# Patient Record
Sex: MALE | Race: ASIAN | NOT HISPANIC OR LATINO | URBAN - METROPOLITAN AREA
[De-identification: names, ages, dates, MRNs, and addresses within clinical notes are randomized per-mention and may not be internally consistent; named-entity substitution may affect disease eponyms.]

---

## 2017-09-10 PROBLEM — 274774002 ELEVATED LEVELS OF TRANSAMINASE & LACTIC ACID DEHYDROGENASE: Status: ACTIVE | Noted: 2017-05-04

## 2017-09-10 PROBLEM — 398050005 DIVERTICULAR DISEASE OF COLON: Status: ACTIVE | Noted: 2017-07-28

## 2017-09-10 PROBLEM — 68496003 POLYP OF COLON: Status: ACTIVE | Noted: 2017-07-31

## 2017-09-10 PROBLEM — 721703004 FIRST DEGREE HEMORRHOIDS: Status: ACTIVE | Noted: 2017-07-31

## 2017-09-10 PROBLEM — 266433003 GASTRO-ESOPHAGEAL REFLUX DISEASE WITH ESOPHAGITIS: Status: ACTIVE | Noted: 2017-05-04

## 2018-08-20 PROBLEM — 271835004: Status: ACTIVE | Noted: 2018-08-20

## 2018-08-20 PROBLEM — 442076002: Status: ACTIVE | Noted: 2018-08-20

## 2023-06-08 ENCOUNTER — TELEPHONE ENCOUNTER (OUTPATIENT)
Dept: URBAN - METROPOLITAN AREA CLINIC 35 | Facility: CLINIC | Age: 78
End: 2023-06-08

## 2023-06-12 ENCOUNTER — OFFICE VISIT (OUTPATIENT)
Dept: URBAN - METROPOLITAN AREA CLINIC 33 | Facility: CLINIC | Age: 78
End: 2023-06-12
Payer: COMMERCIAL

## 2023-06-12 VITALS
HEIGHT: 63 IN | OXYGEN SATURATION: 99 % | HEART RATE: 60 BPM | BODY MASS INDEX: 25.52 KG/M2 | SYSTOLIC BLOOD PRESSURE: 110 MMHG | DIASTOLIC BLOOD PRESSURE: 66 MMHG | WEIGHT: 144 LBS

## 2023-06-12 DIAGNOSIS — K21.9 GASTROESOPHAGEAL REFLUX DISEASE, UNSPECIFIED WHETHER ESOPHAGITIS PRESENT: ICD-10-CM

## 2023-06-12 DIAGNOSIS — K22.70 BARRETT'S ESOPHAGUS WITHOUT DYSPLASIA: ICD-10-CM

## 2023-06-12 PROCEDURE — 99214 OFFICE O/P EST MOD 30 MIN: CPT | Performed by: INTERNAL MEDICINE

## 2023-06-12 RX ORDER — TAMSULOSIN HYDROCHLORIDE 0.4 MG/1
1 CAPSULE CAPSULE ORAL ONCE A DAY
Status: ACTIVE | COMMUNITY

## 2023-06-12 RX ORDER — SUCRALFATE 1 G/1
1 TABLET TABLET ORAL TWICE A DAY
Qty: 180 TABLET | Refills: 2 | Status: ON HOLD | COMMUNITY
Start: 2017-12-09

## 2023-06-12 RX ORDER — RANITIDINE 150 MG/1
1 TABLET TABLET, COATED ORAL
Status: ON HOLD | COMMUNITY
Start: 2017-05-04

## 2023-06-12 RX ORDER — OMEGA-3 FATTY ACIDS/FISH OIL 360-1200MG
1 CAPSULE CAPSULE ORAL ONCE A DAY
Status: ACTIVE | COMMUNITY

## 2023-06-12 RX ORDER — FLUTICASONE FUROATE AND VILANTEROL TRIFENATATE 100; 25 UG/1; UG/1
1 PUFF POWDER RESPIRATORY (INHALATION) ONCE A DAY
Status: ACTIVE | COMMUNITY

## 2023-06-12 RX ORDER — CALCIUM CARBONATE 600 MG
TABLET ORAL ONCE A DAY
Status: ON HOLD | COMMUNITY

## 2023-06-12 RX ORDER — MONTELUKAST 10 MG/1
1 TABLET TABLET, FILM COATED ORAL ONCE A DAY
Status: ACTIVE | COMMUNITY

## 2023-06-12 RX ORDER — FAMOTIDINE 40 MG/1
1 TABLET AM AND AT BEDTIME TABLET, FILM COATED ORAL TWICE A DAY
Status: ACTIVE | COMMUNITY

## 2023-06-12 RX ORDER — SUCRALFATE 1 G/1
1 TABLET ON AN EMPTY STOMACH TABLET ORAL TWICE A DAY
OUTPATIENT
Start: 2023-06-23

## 2023-06-12 RX ORDER — FAMOTIDINE 40 MG/1
1 TABLET AM AND AT BEDTIME TABLET, FILM COATED ORAL TWICE A DAY
OUTPATIENT

## 2023-06-12 RX ORDER — ASPIRIN 81 MG/1
1 TABLET TABLET, COATED ORAL ONCE A DAY
Status: ACTIVE | COMMUNITY

## 2023-06-12 RX ORDER — CLOPIDOGREL BISULFATE 75 MG/1
1 TABLET TABLET ORAL ONCE A DAY
Status: ACTIVE | COMMUNITY

## 2023-06-12 RX ORDER — RANITIDINE HYDROCHLORIDE 300 MG/1
1 TABLET TABLET, FILM COATED ORAL BID
Status: DISCONTINUED | COMMUNITY

## 2023-06-12 RX ORDER — MONTELUKAST SODIUM 10 MG/1
1 TABLET IN THE EVENING TABLET, FILM COATED ORAL ONCE A DAY
Status: ACTIVE | COMMUNITY

## 2023-06-12 NOTE — HPI-BARRETT'S ESOPHAGUS
78 year old male patient presents today for a lonstanding history of Valencia's Esophagus. Patient admits longstanding hx of heartburn and acid reflux. Patient states that his current symptoms are SOB and a non productive cough associated with eating on May 15. He did drink alcohol on that day which exacerbated.  He denies taking any prescribe or OTC medications without relief of his symptoms.   Patient denies any associated symptoms of dyspepsia, dysphagia, excessive belching, globus, sour eructations, bloating/gas, early satiety, changes in appetite, coughing, abdominal/epigastric pain, or changes in bowel habits. . Patient admits/denies any family hx of gastric/esophageal cancer or diseases.    Patient last EGD was completed on 12.08.2017.

## 2023-06-12 NOTE — HPI-GERD
Patient admits long standing history of GERD. Patient states his last episode of gerd was this morning and occured last night. He describes his symtoms as a burning sensation. Patient admits continued of Zantac to control  his symptoms.   Denies any recent episodes of  dysphagia, globus, sour eructation, bloating/gas, indigestion, early satiety, changes in appetite, abdominal/epigastric pain, or changes in bowel habits.

## 2023-06-13 ENCOUNTER — TELEPHONE ENCOUNTER (OUTPATIENT)
Dept: URBAN - METROPOLITAN AREA CLINIC 35 | Facility: CLINIC | Age: 78
End: 2023-06-13

## 2023-06-15 ENCOUNTER — OFFICE VISIT (OUTPATIENT)
Dept: URBAN - METROPOLITAN AREA CLINIC 33 | Facility: CLINIC | Age: 78
End: 2023-06-15

## 2023-06-15 RX ORDER — MONTELUKAST SODIUM 10 MG/1
1 TABLET IN THE EVENING TABLET, FILM COATED ORAL ONCE A DAY
Status: ACTIVE | COMMUNITY

## 2023-06-15 RX ORDER — SUCRALFATE 1 G/1
1 TABLET TABLET ORAL TWICE A DAY
Qty: 180 TABLET | Refills: 2 | Status: ACTIVE | COMMUNITY
Start: 2017-12-09

## 2023-06-15 RX ORDER — CALCIUM CARBONATE 600 MG
TABLET ORAL ONCE A DAY
Status: ACTIVE | COMMUNITY

## 2023-06-15 RX ORDER — FLUTICASONE FUROATE AND VILANTEROL TRIFENATATE 100; 25 UG/1; UG/1
1 PUFF POWDER RESPIRATORY (INHALATION) ONCE A DAY
Status: ACTIVE | COMMUNITY

## 2023-06-15 RX ORDER — TAMSULOSIN HYDROCHLORIDE 0.4 MG/1
1 CAPSULE CAPSULE ORAL ONCE A DAY
Status: ACTIVE | COMMUNITY

## 2023-06-15 RX ORDER — OMEGA-3 FATTY ACIDS/FISH OIL 360-1200MG
1 CAPSULE CAPSULE ORAL ONCE A DAY
Status: ACTIVE | COMMUNITY

## 2023-06-16 ENCOUNTER — CLAIMS CREATED FROM THE CLAIM WINDOW (OUTPATIENT)
Dept: URBAN - METROPOLITAN AREA SURGERY CENTER 8 | Facility: SURGERY CENTER | Age: 78
End: 2023-06-16
Payer: COMMERCIAL

## 2023-06-16 ENCOUNTER — CLAIMS CREATED FROM THE CLAIM WINDOW (OUTPATIENT)
Dept: URBAN - METROPOLITAN AREA CLINIC 4 | Facility: CLINIC | Age: 78
End: 2023-06-16
Payer: COMMERCIAL

## 2023-06-16 ENCOUNTER — OFFICE VISIT (OUTPATIENT)
Dept: URBAN - METROPOLITAN AREA SURGERY CENTER 8 | Facility: SURGERY CENTER | Age: 78
End: 2023-06-16

## 2023-06-16 DIAGNOSIS — K22.70 BARRETT'S ESOPHAGUS WITHOUT DYSPLASIA: ICD-10-CM

## 2023-06-16 DIAGNOSIS — K22.70 BARRETT ESOPHAGUS: ICD-10-CM

## 2023-06-16 DIAGNOSIS — K21.9 GASTRO-ESOPHAGEAL REFLUX DISEASE WITHOUT ESOPHAGITIS: ICD-10-CM

## 2023-06-16 DIAGNOSIS — K22.719 BARRETT'S ESOPHAGUS WITH DYSPLASIA, UNSPECIFIED: ICD-10-CM

## 2023-06-16 DIAGNOSIS — K21.9 ACID REFLUX: ICD-10-CM

## 2023-06-16 DIAGNOSIS — R12 HEARTBURN: ICD-10-CM

## 2023-06-16 PROCEDURE — 88312 SPECIAL STAINS GROUP 1: CPT | Performed by: PATHOLOGY

## 2023-06-16 PROCEDURE — G8907 PT DOC NO EVENTS ON DISCHARG: HCPCS | Performed by: INTERNAL MEDICINE

## 2023-06-16 PROCEDURE — 88305 TISSUE EXAM BY PATHOLOGIST: CPT | Performed by: PATHOLOGY

## 2023-06-16 PROCEDURE — 43239 EGD BIOPSY SINGLE/MULTIPLE: CPT | Performed by: INTERNAL MEDICINE

## 2023-06-16 PROCEDURE — 00731 ANES UPR GI NDSC PX NOS: CPT | Performed by: NURSE ANESTHETIST, CERTIFIED REGISTERED

## 2023-06-16 PROCEDURE — 99100 ANES PT EXTEME AGE<1 YR&>70: CPT | Performed by: NURSE ANESTHETIST, CERTIFIED REGISTERED

## 2023-06-16 RX ORDER — OMEGA-3 FATTY ACIDS/FISH OIL 360-1200MG
1 CAPSULE CAPSULE ORAL ONCE A DAY
Status: ACTIVE | COMMUNITY

## 2023-06-16 RX ORDER — SUCRALFATE 1 G/1
1 TABLET TABLET ORAL TWICE A DAY
Qty: 180 TABLET | Refills: 2 | Status: ACTIVE | COMMUNITY
Start: 2017-12-09

## 2023-06-16 RX ORDER — CLOPIDOGREL BISULFATE 75 MG/1
1 TABLET TABLET ORAL ONCE A DAY
Status: ACTIVE | COMMUNITY

## 2023-06-16 RX ORDER — CALCIUM CARBONATE 600 MG
TABLET ORAL ONCE A DAY
Status: ACTIVE | COMMUNITY

## 2023-06-16 RX ORDER — MONTELUKAST SODIUM 10 MG/1
1 TABLET IN THE EVENING TABLET, FILM COATED ORAL ONCE A DAY
Status: ACTIVE | COMMUNITY

## 2023-06-16 RX ORDER — ASPIRIN 81 MG/1
1 TABLET TABLET, COATED ORAL ONCE A DAY
Status: ACTIVE | COMMUNITY

## 2023-06-16 RX ORDER — RANITIDINE 150 MG/1
1 TABLET TABLET, COATED ORAL
Status: ON HOLD | COMMUNITY
Start: 2017-05-04

## 2023-06-16 RX ORDER — FLUTICASONE FUROATE AND VILANTEROL TRIFENATATE 100; 25 UG/1; UG/1
1 PUFF POWDER RESPIRATORY (INHALATION) ONCE A DAY
Status: ACTIVE | COMMUNITY

## 2023-06-16 RX ORDER — MONTELUKAST 10 MG/1
1 TABLET TABLET, FILM COATED ORAL ONCE A DAY
Status: ACTIVE | COMMUNITY

## 2023-06-16 RX ORDER — FAMOTIDINE 40 MG/1
1 TABLET AM AND AT BEDTIME TABLET, FILM COATED ORAL TWICE A DAY
Status: ACTIVE | COMMUNITY

## 2023-06-16 RX ORDER — TAMSULOSIN HYDROCHLORIDE 0.4 MG/1
1 CAPSULE CAPSULE ORAL ONCE A DAY
Status: ACTIVE | COMMUNITY

## 2023-06-19 ENCOUNTER — OFFICE VISIT (OUTPATIENT)
Dept: URBAN - METROPOLITAN AREA CLINIC 36 | Facility: CLINIC | Age: 78
End: 2023-06-19

## 2023-06-23 ENCOUNTER — TELEPHONE ENCOUNTER (OUTPATIENT)
Dept: URBAN - METROPOLITAN AREA CLINIC 35 | Facility: CLINIC | Age: 78
End: 2023-06-23

## 2023-06-23 RX ORDER — OMEGA-3 FATTY ACIDS/FISH OIL 360-1200MG
1 CAPSULE CAPSULE ORAL ONCE A DAY
Status: ACTIVE | COMMUNITY

## 2023-06-23 RX ORDER — SUCRALFATE 1 G/1
1 TABLET TABLET ORAL TWICE A DAY
Qty: 180 TABLET | Refills: 2 | Status: ACTIVE | COMMUNITY
Start: 2017-12-09

## 2023-06-23 RX ORDER — ASPIRIN 81 MG/1
1 TABLET TABLET, COATED ORAL ONCE A DAY
Status: ACTIVE | COMMUNITY

## 2023-06-23 RX ORDER — CALCIUM CARBONATE 600 MG
TABLET ORAL ONCE A DAY
Status: ACTIVE | COMMUNITY

## 2023-06-23 RX ORDER — FAMOTIDINE 40 MG/1
1 TABLET AM AND AT BEDTIME TABLET, FILM COATED ORAL TWICE A DAY
Status: ACTIVE | COMMUNITY

## 2023-06-23 RX ORDER — MONTELUKAST SODIUM 10 MG/1
1 TABLET IN THE EVENING TABLET, FILM COATED ORAL ONCE A DAY
Status: ACTIVE | COMMUNITY

## 2023-06-23 RX ORDER — TAMSULOSIN HYDROCHLORIDE 0.4 MG/1
1 CAPSULE CAPSULE ORAL ONCE A DAY
Status: ACTIVE | COMMUNITY

## 2023-06-23 RX ORDER — CLOPIDOGREL BISULFATE 75 MG/1
1 TABLET TABLET ORAL ONCE A DAY
Status: ACTIVE | COMMUNITY

## 2023-06-23 RX ORDER — RANITIDINE 150 MG/1
1 TABLET TABLET, COATED ORAL
Status: ON HOLD | COMMUNITY
Start: 2017-05-04

## 2023-06-23 RX ORDER — FLUTICASONE FUROATE AND VILANTEROL TRIFENATATE 100; 25 UG/1; UG/1
1 PUFF POWDER RESPIRATORY (INHALATION) ONCE A DAY
Status: ACTIVE | COMMUNITY

## 2023-06-23 RX ORDER — SUCRALFATE 1 G/1
1 TABLET ON AN EMPTY STOMACH TABLET ORAL TWICE A DAY
Qty: 180 TABLET | Refills: 3 | OUTPATIENT
Start: 2023-06-23 | End: 2024-06-17

## 2023-06-23 RX ORDER — MONTELUKAST 10 MG/1
1 TABLET TABLET, FILM COATED ORAL ONCE A DAY
Status: ACTIVE | COMMUNITY

## 2023-07-03 ENCOUNTER — OFFICE VISIT (OUTPATIENT)
Dept: URBAN - METROPOLITAN AREA CLINIC 33 | Facility: CLINIC | Age: 78
End: 2023-07-03
Payer: COMMERCIAL

## 2023-07-03 VITALS
DIASTOLIC BLOOD PRESSURE: 72 MMHG | WEIGHT: 149 LBS | BODY MASS INDEX: 26.4 KG/M2 | HEIGHT: 63 IN | OXYGEN SATURATION: 98 % | SYSTOLIC BLOOD PRESSURE: 114 MMHG | HEART RATE: 66 BPM

## 2023-07-03 DIAGNOSIS — K21.9 GASTROESOPHAGEAL REFLUX DISEASE, UNSPECIFIED WHETHER ESOPHAGITIS PRESENT: ICD-10-CM

## 2023-07-03 DIAGNOSIS — K22.70 BARRETT'S ESOPHAGUS WITHOUT DYSPLASIA: ICD-10-CM

## 2023-07-03 PROCEDURE — 99214 OFFICE O/P EST MOD 30 MIN: CPT | Performed by: INTERNAL MEDICINE

## 2023-07-03 RX ORDER — SUCRALFATE 1 G/1
1 TABLET ON AN EMPTY STOMACH TABLET ORAL TWICE A DAY
OUTPATIENT

## 2023-07-03 RX ORDER — FAMOTIDINE 40 MG/1
1 TABLET AM AND AT BEDTIME TABLET, FILM COATED ORAL TWICE A DAY
Status: ACTIVE | COMMUNITY

## 2023-07-03 RX ORDER — CLOPIDOGREL BISULFATE 75 MG/1
1 TABLET TABLET ORAL ONCE A DAY
Status: ACTIVE | COMMUNITY

## 2023-07-03 RX ORDER — CALCIUM CARBONATE 600 MG
TABLET ORAL ONCE A DAY
Status: ACTIVE | COMMUNITY

## 2023-07-03 RX ORDER — FLUTICASONE FUROATE AND VILANTEROL TRIFENATATE 100; 25 UG/1; UG/1
1 PUFF POWDER RESPIRATORY (INHALATION) ONCE A DAY
Status: ON HOLD | COMMUNITY

## 2023-07-03 RX ORDER — OMEGA-3 FATTY ACIDS/FISH OIL 360-1200MG
1 CAPSULE CAPSULE ORAL ONCE A DAY
Status: ACTIVE | COMMUNITY

## 2023-07-03 RX ORDER — FAMOTIDINE 40 MG/1
1 TABLET AM AND AT BEDTIME TABLET, FILM COATED ORAL TWICE A DAY
OUTPATIENT

## 2023-07-03 RX ORDER — SUCRALFATE 1 G/1
1 TABLET ON AN EMPTY STOMACH TABLET ORAL TWICE A DAY
Qty: 180 TABLET | Refills: 3 | Status: ACTIVE | COMMUNITY
Start: 2023-06-23 | End: 2024-06-17

## 2023-07-03 RX ORDER — ASPIRIN 81 MG/1
1 TABLET TABLET, COATED ORAL ONCE A DAY
Status: ACTIVE | COMMUNITY

## 2023-07-03 RX ORDER — RANITIDINE 150 MG/1
1 TABLET TABLET, COATED ORAL
Status: ON HOLD | COMMUNITY
Start: 2017-05-04

## 2023-07-03 RX ORDER — MONTELUKAST SODIUM 10 MG/1
1 TABLET IN THE EVENING TABLET, FILM COATED ORAL ONCE A DAY
Status: ACTIVE | COMMUNITY

## 2023-07-03 RX ORDER — MONTELUKAST 10 MG/1
1 TABLET TABLET, FILM COATED ORAL ONCE A DAY
Status: ACTIVE | COMMUNITY

## 2023-07-03 RX ORDER — TAMSULOSIN HYDROCHLORIDE 0.4 MG/1
1 CAPSULE CAPSULE ORAL ONCE A DAY
Status: ACTIVE | COMMUNITY

## 2023-07-03 NOTE — HPI-ENDOSCOPY (EGD) FOLLOWUP
Patient presents today for a follow up from his endoscopy. He denies any complications after his procedure. Since his procedure he denies any recent episodes of dysphagia, globus, a change in appetite or bowel habits.

## 2023-07-03 NOTE — HPI-GERD
Patient continues to take Famotidine 40 mg BID with "mostly" controlled symptoms. He states that taking Carafate 1 GM BID has helped a lot. Patient denies any associated symptoms of nausea, vomiting, sour eructation, or bloating/gas. Patient admits to some coughing when he experiences shortness of breath.  (Last Visit 06.12.2023) Patient admits long-standing history of GERD. Patient states his last episode of GERD was this morning and occurred last night. He describes his symptoms as a burning sensation. Patient admits continued of Zantac to control  his symptoms.   Denies any recent episodes of  dysphagia, globus, sour eructation, bloating/gas, indigestion, early satiety, changes in appetite, abdominal/epigastric pain, or changes in bowel habits.

## 2023-07-03 NOTE — HPI-BARRETT'S ESOPHAGUS
Patient denies any recent flares with his Valencia's Esophagus. He admits continued use of Sucralfate 1gm BID with better control of symptoms. Patient denies any associated symptoms of dyspepsia, dysphagia, excessive belching, globus, sour eructations, bloating/gas, changes in appetite, abdominal/epigastric pain, or changes in bowel habits. Sometimes he will cough if he experiences shortness of breath. Patient denies any family hx of gastric/esophageal cancer or diseases. Patient will be having the Nissen fundoplication with Dr. Keith Almonte this coming Friday. Of note, sometimes patient experiences early satiety and has been eating smaller meals, which helps.   (Last Visit 06.12.2023) 78 year old male patient presents today for a longstanding history of Valencia's Esophagus. Patient admits longstanding hx of heartburn and acid reflux. Patient states that his current symptoms are SOB and a non-productive cough associated with eating on May 15. He did drink alcohol on that day which exacerbated.  He denies taking any prescribe or OTC medications without relief of his symptoms.   Patient denies any associated symptoms of dyspepsia, dysphagia, excessive belching, globus, sour eructations, bloating/gas, early satiety, changes in appetite, coughing, abdominal/epigastric pain, or changes in bowel habits. . Patient admits/denies any family hx of gastric/esophageal cancer or diseases.    Patient last EGD was completed on 12.08.2017.

## 2023-07-18 PROBLEM — 235595009: Status: ACTIVE | Noted: 2023-06-12

## 2023-11-07 ENCOUNTER — OFFICE VISIT (OUTPATIENT)
Dept: URBAN - METROPOLITAN AREA CLINIC 35 | Facility: CLINIC | Age: 78
End: 2023-11-07
Payer: COMMERCIAL

## 2023-11-07 VITALS
HEART RATE: 66 BPM | DIASTOLIC BLOOD PRESSURE: 74 MMHG | BODY MASS INDEX: 25.69 KG/M2 | SYSTOLIC BLOOD PRESSURE: 116 MMHG | HEIGHT: 63 IN | WEIGHT: 145 LBS | OXYGEN SATURATION: 97 %

## 2023-11-07 DIAGNOSIS — K21.00 GASTROESOPHAGEAL REFLUX DISEASE WITH ESOPHAGITIS WITHOUT HEMORRHAGE: ICD-10-CM

## 2023-11-07 DIAGNOSIS — K22.70 BARRETT'S ESOPHAGUS WITHOUT DYSPLASIA: ICD-10-CM

## 2023-11-07 PROBLEM — 266433003: Status: ACTIVE | Noted: 2023-11-07

## 2023-11-07 PROCEDURE — 99214 OFFICE O/P EST MOD 30 MIN: CPT | Performed by: INTERNAL MEDICINE

## 2023-11-07 RX ORDER — FAMOTIDINE 40 MG/1
1 TABLET AM AND AT BEDTIME TABLET, FILM COATED ORAL TWICE A DAY
Status: ACTIVE | COMMUNITY

## 2023-11-07 RX ORDER — CLOPIDOGREL BISULFATE 75 MG/1
1 TABLET TABLET ORAL ONCE A DAY
Status: ACTIVE | COMMUNITY

## 2023-11-07 RX ORDER — CALCIUM CARBONATE 600 MG
TABLET ORAL ONCE A DAY
Status: ACTIVE | COMMUNITY

## 2023-11-07 RX ORDER — FAMOTIDINE 40 MG/1
1 TABLET AM AND AT BEDTIME TABLET, FILM COATED ORAL TWICE A DAY
OUTPATIENT

## 2023-11-07 RX ORDER — FLUTICASONE FUROATE AND VILANTEROL TRIFENATATE 100; 25 UG/1; UG/1
1 PUFF POWDER RESPIRATORY (INHALATION) ONCE A DAY
Status: ON HOLD | COMMUNITY

## 2023-11-07 RX ORDER — RANITIDINE 150 MG/1
1 TABLET TABLET, COATED ORAL
Status: ON HOLD | COMMUNITY
Start: 2017-05-04

## 2023-11-07 RX ORDER — SUCRALFATE 1 G/1
1 TABLET ON AN EMPTY STOMACH TABLET ORAL TWICE A DAY
Status: ON HOLD | COMMUNITY

## 2023-11-07 RX ORDER — ASPIRIN 81 MG/1
1 TABLET TABLET, COATED ORAL ONCE A DAY
Status: ACTIVE | COMMUNITY

## 2023-11-07 RX ORDER — MONTELUKAST 10 MG/1
1 TABLET TABLET, FILM COATED ORAL ONCE A DAY
Status: ACTIVE | COMMUNITY

## 2023-11-07 RX ORDER — OMEGA-3 FATTY ACIDS/FISH OIL 360-1200MG
1 CAPSULE CAPSULE ORAL ONCE A DAY
Status: ACTIVE | COMMUNITY

## 2023-11-07 RX ORDER — TAMSULOSIN HYDROCHLORIDE 0.4 MG/1
1 CAPSULE CAPSULE ORAL ONCE A DAY
Status: ACTIVE | COMMUNITY

## 2023-11-07 NOTE — HPI-GERD
He denies continued use of Sucralfate 1gm and admits taking  Patient denies any recent episodes of gerd at this time.   (Last Visit 07.03.2023) Patient continues to take Famotidine 40 mg BID with "mostly" controlled symptoms. He states that taking Carafate 1 GM BID has helped a lot. Patient denies any associated symptoms of nausea, vomiting, sour eructation, or bloating/gas. Patient admits to some coughing when he experiences shortness of breath.  (Last Visit 06.12.2023) Patient admits long-standing history of GERD. Patient states his last episode of GERD was this morning and occurred last night. He describes his symptoms as a burning sensation. Patient admits continued of Zantac to control  his symptoms.   Denies any recent episodes of  dysphagia, globus, sour eructation, bloating/gas, indigestion, early satiety, changes in appetite, abdominal/epigastric pain, or changes in bowel habits.

## 2023-11-07 NOTE — HPI-BARRETT'S ESOPHAGUS
Patient present today for a 3 month follow-up of Valencia's esophagus. Patiet states he has not had any recent flares or issues with his Valencia's esophagus. He is not taking Sucralafate. Patient is currently taking Famotidine 40 mg, BID.  Patient denies any associated symptoms of dyspepsia, dysphagia, excessive belching, globus, sour eructations, bloating/gas, early satiety, changes in appetite, or coughing. Of note, patient had the nissen fundoplication on August 8th, 2023 with Dr. Almonte.  (Last Visit 07.03.2023) Patient denies any recent flares with his Valencia's Esophagus. He admits continued use of Sucralfate 1gm BID with better control of symptoms. Patient denies any associated symptoms of dyspepsia, dysphagia, excessive belching, globus, sour eructations, bloating/gas, changes in appetite, abdominal/epigastric pain, or changes in bowel habits. Sometimes he will cough if he experiences shortness of breath. Patient denies any family hx of gastric/esophageal cancer or diseases. Patient will be having the Nissen fundoplication with Dr. Keith Almonte this coming Friday. Of note, sometimes patient experiences early satiety and has been eating smaller meals, which helps. The surgical note was reviewed   (Last Visit 06.12.2023) 78 year old male patient presents today for a longstanding history of Valencia's Esophagus. Patient admits longstanding hx of heartburn and acid reflux. Patient states that his current symptoms are SOB and a non-productive cough associated with eating on May 15. He did drink alcohol on that day which exacerbated.  He denies taking any prescribe or OTC medications without relief of his symptoms.   Patient denies any associated symptoms of dyspepsia, dysphagia, excessive belching, globus, sour eructations, bloating/gas, early satiety, changes in appetite, coughing, abdominal/epigastric pain, or changes in bowel habits. . Patient admits/denies any family hx of gastric/esophageal cancer or diseases.    Patient last EGD was completed on 12.08.2017.

## 2024-04-16 ENCOUNTER — OV EP (OUTPATIENT)
Dept: URBAN - METROPOLITAN AREA CLINIC 35 | Facility: CLINIC | Age: 79
End: 2024-04-16

## 2024-04-16 RX ORDER — MONTELUKAST 10 MG/1
1 TABLET TABLET, FILM COATED ORAL ONCE A DAY
COMMUNITY

## 2024-04-16 RX ORDER — ASPIRIN 81 MG/1
1 TABLET TABLET, COATED ORAL ONCE A DAY
COMMUNITY

## 2024-04-16 RX ORDER — FLUTICASONE FUROATE AND VILANTEROL TRIFENATATE 100; 25 UG/1; UG/1
1 PUFF POWDER RESPIRATORY (INHALATION) ONCE A DAY
Status: ON HOLD | COMMUNITY

## 2024-04-16 RX ORDER — RANITIDINE 150 MG/1
1 TABLET TABLET, COATED ORAL
Status: ON HOLD | COMMUNITY
Start: 2017-05-04

## 2024-04-16 RX ORDER — SUCRALFATE 1 G/1
1 TABLET ON AN EMPTY STOMACH TABLET ORAL TWICE A DAY
Status: ON HOLD | COMMUNITY

## 2024-04-16 RX ORDER — FAMOTIDINE 40 MG/1
1 TABLET AM AND AT BEDTIME TABLET, FILM COATED ORAL TWICE A DAY
COMMUNITY

## 2024-04-16 RX ORDER — CALCIUM CARBONATE 600 MG
TABLET ORAL ONCE A DAY
COMMUNITY

## 2024-04-16 RX ORDER — CLOPIDOGREL BISULFATE 75 MG/1
1 TABLET TABLET ORAL ONCE A DAY
COMMUNITY

## 2024-04-16 RX ORDER — OMEGA-3 FATTY ACIDS/FISH OIL 360-1200MG
1 CAPSULE CAPSULE ORAL ONCE A DAY
COMMUNITY

## 2024-04-16 RX ORDER — TAMSULOSIN HYDROCHLORIDE 0.4 MG/1
1 CAPSULE CAPSULE ORAL ONCE A DAY
COMMUNITY

## 2024-04-16 RX ORDER — FAMOTIDINE 40 MG/1
1 TABLET AM AND AT BEDTIME TABLET, FILM COATED ORAL TWICE A DAY
OUTPATIENT

## 2024-04-16 NOTE — HPI-BARRETT'S ESOPHAGUS
Patient present today for a follow-up of Valencia's esophagus. He states he has/not had any recent flares since his last visit. Patient admits continued use of Famotidine 40mg BID with/out control symptoms. He admits/denies any associated symptoms of dyspepsia, dysphagia, excessive belching, globus, sour eructations, bloating/gas, early satiety, changes in appetite, or epigastric pain.   Most recent labs completed on 01.10.2024 revealed Glucose: 109H, Potassium: 5.5H, Calcium: 108H, Vitamin B12:>2000H, PSA,Total: 9.95H, Vitamin D, 25-OH, Total, IA: 28L, all other labs were normal.   (Last Visit 11.07.2023) Patient present today for a 3 month follow-up of Valencia's esophagus. Patiet states he has not had any recent flares or issues with his Valencia's esophagus. He is not taking Sucralafate. Patient is currently taking Famotidine 40 mg, BID.  Patient denies any associated symptoms of dyspepsia, dysphagia, excessive belching, globus, sour eructations, bloating/gas, early satiety, changes in appetite, or coughing. Of note, patient had the nissen fundoplication on August 8th, 2023 with Dr. Almonte.  (Last Visit 07.03.2023) Patient denies any recent flares with his Valencia's Esophagus. He admits continued use of Sucralfate 1gm BID with better control of symptoms. Patient denies any associated symptoms of dyspepsia, dysphagia, excessive belching, globus, sour eructations, bloating/gas, changes in appetite, abdominal/epigastric pain, or changes in bowel habits. Sometimes he will cough if he experiences shortness of breath. Patient denies any family hx of gastric/esophageal cancer or diseases. Patient will be having the Nissen fundoplication with Dr. Keith Almonte this coming Friday. Of note, sometimes patient experiences early satiety and has been eating smaller meals, which helps. The surgical note was reviewed   (Last Visit 06.12.2023) 78 year old male patient presents today for a longstanding history of Valencia's Esophagus. Patient admits longstanding hx of heartburn and acid reflux. Patient states that his current symptoms are SOB and a non-productive cough associated with eating on May 15. He did drink alcohol on that day which exacerbated.  He denies taking any prescribe or OTC medications without relief of his symptoms.   Patient denies any associated symptoms of dyspepsia, dysphagia, excessive belching, globus, sour eructations, bloating/gas, early satiety, changes in appetite, coughing, abdominal/epigastric pain, or changes in bowel habits. . Patient admits/denies any family hx of gastric/esophageal cancer or diseases.    Patient last EGD was completed on 12.08.2017.

## 2024-04-16 NOTE — HPI-GERD
Patient admits/denies any recent episodes of gerd at this time.   (Last Visit 11.07.2023) He denies continued use of Sucralfate 1gm and admits taking  Patient denies any recent episodes of gerd at this time.   (Last Visit 07.03.2023) Patient continues to take Famotidine 40 mg BID with "mostly" controlled symptoms. He states that taking Carafate 1 GM BID has helped a lot. Patient denies any associated symptoms of nausea, vomiting, sour eructation, or bloating/gas. Patient admits to some coughing when he experiences shortness of breath.  (Last Visit 06.12.2023) Patient admits long-standing history of GERD. Patient states his last episode of GERD was this morning and occurred last night. He describes his symptoms as a burning sensation. Patient admits continued of Zantac to control  his symptoms.   Denies any recent episodes of  dysphagia, globus, sour eructation, bloating/gas, indigestion, early satiety, changes in appetite, abdominal/epigastric pain, or changes in bowel habits.

## 2024-07-26 ENCOUNTER — DASHBOARD ENCOUNTERS (OUTPATIENT)
Age: 79
End: 2024-07-26

## 2024-08-06 ENCOUNTER — OFFICE VISIT (OUTPATIENT)
Dept: URBAN - METROPOLITAN AREA CLINIC 35 | Facility: CLINIC | Age: 79
End: 2024-08-06
Payer: COMMERCIAL

## 2024-08-06 ENCOUNTER — TELEPHONE ENCOUNTER (OUTPATIENT)
Dept: URBAN - METROPOLITAN AREA CLINIC 36 | Facility: CLINIC | Age: 79
End: 2024-08-06

## 2024-08-06 VITALS
HEART RATE: 66 BPM | HEIGHT: 63 IN | WEIGHT: 139 LBS | BODY MASS INDEX: 24.63 KG/M2 | SYSTOLIC BLOOD PRESSURE: 118 MMHG | DIASTOLIC BLOOD PRESSURE: 60 MMHG | OXYGEN SATURATION: 97 %

## 2024-08-06 DIAGNOSIS — K21.00 GASTROESOPHAGEAL REFLUX DISEASE WITH ESOPHAGITIS WITHOUT HEMORRHAGE: ICD-10-CM

## 2024-08-06 DIAGNOSIS — K22.70 BARRETT'S ESOPHAGUS WITHOUT DYSPLASIA: ICD-10-CM

## 2024-08-06 PROCEDURE — 99214 OFFICE O/P EST MOD 30 MIN: CPT | Performed by: INTERNAL MEDICINE

## 2024-08-06 RX ORDER — DUTASTERIDE 0.5 MG/1
1 CAPSULE CAPSULE, LIQUID FILLED ORAL ONCE A DAY
Status: ACTIVE | COMMUNITY

## 2024-08-06 RX ORDER — RANITIDINE 150 MG/1
1 TABLET TABLET, COATED ORAL
Status: ON HOLD | COMMUNITY
Start: 2017-05-04

## 2024-08-06 RX ORDER — ASPIRIN 81 MG/1
1 TABLET TABLET, COATED ORAL ONCE A DAY
Status: ACTIVE | COMMUNITY

## 2024-08-06 RX ORDER — FLUTICASONE FUROATE AND VILANTEROL TRIFENATATE 100; 25 UG/1; UG/1
1 PUFF POWDER RESPIRATORY (INHALATION) ONCE A DAY
Status: ON HOLD | COMMUNITY

## 2024-08-06 RX ORDER — CALCIUM CARBONATE 600 MG
TABLET ORAL ONCE A DAY
Status: ACTIVE | COMMUNITY

## 2024-08-06 RX ORDER — LOSARTAN POTASSIUM 50 MG/1
1 TABLET TABLET, FILM COATED ORAL ONCE A DAY
Status: ACTIVE | COMMUNITY

## 2024-08-06 RX ORDER — TAMSULOSIN HYDROCHLORIDE 0.4 MG/1
1 CAPSULE CAPSULE ORAL ONCE A DAY
Status: ACTIVE | COMMUNITY

## 2024-08-06 RX ORDER — MONTELUKAST 10 MG/1
1 TABLET TABLET, FILM COATED ORAL ONCE A DAY
Status: ACTIVE | COMMUNITY

## 2024-08-06 RX ORDER — FAMOTIDINE 40 MG/1
1 TABLET AM AND AT BEDTIME TABLET, FILM COATED ORAL TWICE A DAY
Status: ACTIVE | COMMUNITY

## 2024-08-06 RX ORDER — OMEGA-3 FATTY ACIDS/FISH OIL 360-1200MG
1 CAPSULE CAPSULE ORAL ONCE A DAY
Status: ACTIVE | COMMUNITY

## 2024-08-06 RX ORDER — TAMSULOSIN HYDROCHLORIDE 0.4 MG/1
1 CAPSULE CAPSULE ORAL ONCE A DAY
Status: ON HOLD | COMMUNITY

## 2024-08-06 RX ORDER — SUCRALFATE 1 G/1
1 TABLET ON AN EMPTY STOMACH TABLET ORAL TWICE A DAY
Status: ON HOLD | COMMUNITY

## 2024-08-06 RX ORDER — CLOPIDOGREL BISULFATE 75 MG/1
1 TABLET TABLET ORAL ONCE A DAY
Status: ACTIVE | COMMUNITY

## 2024-08-06 RX ORDER — FAMOTIDINE 40 MG/1
1 TABLET AM AND AT BEDTIME TABLET, FILM COATED ORAL TWICE A DAY
OUTPATIENT

## 2024-08-06 NOTE — HPI-GERD
Patient denies any recent episodes of gerd at this time. Patient continues to take Famotidine 40mg. with controlled symptoms. Patient denies any associated symptoms of nausea, vomitting, sour eructation, or bloating/gas.   (Last Visit 11.07.2023) He denies continued use of Sucralfate 1gm and admits taking  Patient denies any recent episodes of gerd at this time.   (Last Visit 07.03.2023) Patient continues to take Famotidine 40 mg BID with "mostly" controlled symptoms. He states that taking Carafate 1 GM BID has helped a lot. Patient denies any associated symptoms of nausea, vomiting, sour eructation, or bloating/gas. Patient admits to some coughing when he experiences shortness of breath.  (Last Visit 06.12.2023) Patient admits long-standing history of GERD. Patient states his last episode of GERD was this morning and occurred last night. He describes his symptoms as a burning sensation. Patient admits continued of Zantac to control  his symptoms.   Denies any recent episodes of  dysphagia, globus, sour eructation, bloating/gas, indigestion, early satiety, changes in appetite, abdominal/epigastric pain, or changes in bowel habits.

## 2024-08-06 NOTE — HPI-BARRETT'S ESOPHAGUS
Patient present today for a follow-up of Valencia's esophagus. Patient advised at last visit (11/07/2023) to have an EGD. He states he has not had any recent flares since his last visit. He states he had an hiatal hernia surgery last year August 8. 2023. Patient admits continued use of Famotidine 40mg once at bedtime  with control symptoms. He denies any associated symptoms of dyspepsia, dysphagia, excessive belching, globus, sour eructations, bloating/gas, early satiety, changes in appetite, or epigastric pain.   Most recent labs completed on 07.30.2024 revealed Glucose: 117H, Sodium: 134L, Calcium: 10.4H, PSA, Total: 8.56H, all other labs were normal.   Labs 01.10.2024 revealed Glucose: 109H, Potassium: 5.5H, Calcium: 108H, Vitamin B12:>2000H, PSA,Total: 9.95H, Vitamin D, 25-OH, Total, IA: 28L, all other labs were normal.   (Last Visit 11.07.2023) Patient present today for a 3 month follow-up of Valencia's esophagus. Patiet states he has not had any recent flares or issues with his Valencia's esophagus. He is not taking Sucralafate. Patient is currently taking Famotidine 40 mg, BID.  Patient denies any associated symptoms of dyspepsia, dysphagia, excessive belching, globus, sour eructations, bloating/gas, early satiety, changes in appetite, or coughing. Of note, patient had the nissen fundoplication on August 8th, 2023 with Dr. Almonte.  (Last Visit 07.03.2023) Patient denies any recent flares with his Valencia's Esophagus. He admits continued use of Sucralfate 1gm BID with better control of symptoms. Patient denies any associated symptoms of dyspepsia, dysphagia, excessive belching, globus, sour eructations, bloating/gas, changes in appetite, abdominal/epigastric pain, or changes in bowel habits. Sometimes he will cough if he experiences shortness of breath. Patient denies any family hx of gastric/esophageal cancer or diseases. Patient will be having the Nissen fundoplication with Dr. Keith Almonte this coming Friday. Of note, sometimes patient experiences early satiety and has been eating smaller meals, which helps. The surgical note was reviewed   (Last Visit 06.12.2023) 78 year old male patient presents today for a longstanding history of Valencia's Esophagus. Patient admits longstanding hx of heartburn and acid reflux. Patient states that his current symptoms are SOB and a non-productive cough associated with eating on May 15. He did drink alcohol on that day which exacerbated.  He denies taking any prescribe or OTC medications without relief of his symptoms.   Patient denies any associated symptoms of dyspepsia, dysphagia, excessive belching, globus, sour eructations, bloating/gas, early satiety, changes in appetite, coughing, abdominal/epigastric pain, or changes in bowel habits. . Patient admits/denies any family hx of gastric/esophageal cancer or diseases.    Patient last EGD was completed on 12.08.2017.

## 2024-08-09 ENCOUNTER — TELEPHONE ENCOUNTER (OUTPATIENT)
Dept: URBAN - METROPOLITAN AREA CLINIC 35 | Facility: CLINIC | Age: 79
End: 2024-08-09

## 2024-08-30 ENCOUNTER — OFFICE VISIT (OUTPATIENT)
Dept: URBAN - METROPOLITAN AREA SURGERY CENTER 8 | Facility: SURGERY CENTER | Age: 79
End: 2024-08-30

## 2024-08-30 RX ORDER — OMEGA-3 FATTY ACIDS/FISH OIL 360-1200MG
1 CAPSULE CAPSULE ORAL ONCE A DAY
Status: ACTIVE | COMMUNITY

## 2024-08-30 RX ORDER — TAMSULOSIN HYDROCHLORIDE 0.4 MG/1
1 CAPSULE CAPSULE ORAL ONCE A DAY
Status: ACTIVE | COMMUNITY

## 2024-08-30 RX ORDER — FLUTICASONE FUROATE AND VILANTEROL TRIFENATATE 100; 25 UG/1; UG/1
1 PUFF POWDER RESPIRATORY (INHALATION) ONCE A DAY
Status: ON HOLD | COMMUNITY

## 2024-08-30 RX ORDER — SUCRALFATE 1 G/1
1 TABLET ON AN EMPTY STOMACH TABLET ORAL TWICE A DAY
Status: ON HOLD | COMMUNITY

## 2024-08-30 RX ORDER — RANITIDINE 150 MG/1
1 TABLET TABLET, COATED ORAL
Status: ON HOLD | COMMUNITY
Start: 2017-05-04

## 2024-08-30 RX ORDER — LOSARTAN POTASSIUM 50 MG/1
1 TABLET TABLET, FILM COATED ORAL ONCE A DAY
Status: ACTIVE | COMMUNITY

## 2024-08-30 RX ORDER — MONTELUKAST 10 MG/1
1 TABLET TABLET, FILM COATED ORAL ONCE A DAY
Status: ACTIVE | COMMUNITY

## 2024-08-30 RX ORDER — CLOPIDOGREL BISULFATE 75 MG/1
1 TABLET TABLET ORAL ONCE A DAY
Status: ACTIVE | COMMUNITY

## 2024-08-30 RX ORDER — DUTASTERIDE 0.5 MG/1
1 CAPSULE CAPSULE, LIQUID FILLED ORAL ONCE A DAY
Status: ACTIVE | COMMUNITY

## 2024-08-30 RX ORDER — TAMSULOSIN HYDROCHLORIDE 0.4 MG/1
1 CAPSULE CAPSULE ORAL ONCE A DAY
Status: ON HOLD | COMMUNITY

## 2024-08-30 RX ORDER — ASPIRIN 81 MG/1
1 TABLET TABLET, COATED ORAL ONCE A DAY
Status: ACTIVE | COMMUNITY

## 2024-08-30 RX ORDER — FAMOTIDINE 40 MG/1
1 TABLET AM AND AT BEDTIME TABLET, FILM COATED ORAL TWICE A DAY
Status: ACTIVE | COMMUNITY

## 2024-08-30 RX ORDER — CALCIUM CARBONATE 600 MG
TABLET ORAL ONCE A DAY
Status: ACTIVE | COMMUNITY

## 2024-09-24 ENCOUNTER — OFFICE VISIT (OUTPATIENT)
Dept: URBAN - METROPOLITAN AREA CLINIC 31 | Facility: CLINIC | Age: 79
End: 2024-09-24

## 2024-09-24 RX ORDER — CLOPIDOGREL BISULFATE 75 MG/1
1 TABLET TABLET ORAL ONCE A DAY
Status: ACTIVE | COMMUNITY

## 2024-09-24 RX ORDER — FAMOTIDINE 40 MG/1
1 TABLET AM AND AT BEDTIME TABLET, FILM COATED ORAL TWICE A DAY
Status: ACTIVE | COMMUNITY

## 2024-09-24 RX ORDER — SUCRALFATE 1 G/1
1 TABLET ON AN EMPTY STOMACH TABLET ORAL TWICE A DAY
Status: ON HOLD | COMMUNITY

## 2024-09-24 RX ORDER — OMEGA-3 FATTY ACIDS/FISH OIL 360-1200MG
1 CAPSULE CAPSULE ORAL ONCE A DAY
Status: ACTIVE | COMMUNITY

## 2024-09-24 RX ORDER — TAMSULOSIN HYDROCHLORIDE 0.4 MG/1
1 CAPSULE CAPSULE ORAL ONCE A DAY
Status: ACTIVE | COMMUNITY

## 2024-09-24 RX ORDER — ASPIRIN 81 MG/1
1 TABLET TABLET, COATED ORAL ONCE A DAY
Status: ACTIVE | COMMUNITY

## 2024-09-24 RX ORDER — MONTELUKAST 10 MG/1
1 TABLET TABLET, FILM COATED ORAL ONCE A DAY
Status: ACTIVE | COMMUNITY

## 2024-09-24 RX ORDER — LOSARTAN POTASSIUM 50 MG/1
1 TABLET TABLET, FILM COATED ORAL ONCE A DAY
Status: ACTIVE | COMMUNITY

## 2024-09-24 RX ORDER — FAMOTIDINE 40 MG/1
1 TABLET AM AND AT BEDTIME TABLET, FILM COATED ORAL TWICE A DAY
OUTPATIENT

## 2024-09-24 RX ORDER — CALCIUM CARBONATE 600 MG
TABLET ORAL ONCE A DAY
Status: ACTIVE | COMMUNITY

## 2024-09-24 RX ORDER — DUTASTERIDE 0.5 MG/1
1 CAPSULE CAPSULE, LIQUID FILLED ORAL ONCE A DAY
Status: ACTIVE | COMMUNITY

## 2024-09-24 RX ORDER — TAMSULOSIN HYDROCHLORIDE 0.4 MG/1
1 CAPSULE CAPSULE ORAL ONCE A DAY
Status: ON HOLD | COMMUNITY

## 2024-09-24 RX ORDER — FLUTICASONE FUROATE AND VILANTEROL TRIFENATATE 100; 25 UG/1; UG/1
1 PUFF POWDER RESPIRATORY (INHALATION) ONCE A DAY
Status: ON HOLD | COMMUNITY

## 2024-09-24 RX ORDER — RANITIDINE 150 MG/1
1 TABLET TABLET, COATED ORAL
Status: ON HOLD | COMMUNITY
Start: 2017-05-04

## 2024-09-24 NOTE — HPI-GERD
Patient admits/denies any recent episodes of gerd at this time. Patient continues to take Famotidine 40mg. with controlled symptoms. Patient admits/denies any associated symptoms of nausea, vomitting, sour eructation, or bloating/gas.   (Last Visit 08/06/24) Patient denies any recent episodes of gerd at this time. Patient continues to take Famotidine 40mg. with controlled symptoms. Patient denies any associated symptoms of nausea, vomitting, sour eructation, or bloating/gas.   (Last Visit 11.07.2023) He denies continued use of Sucralfate 1gm and admits taking  Patient denies any recent episodes of gerd at this time.   (Last Visit 07.03.2023) Patient continues to take Famotidine 40 mg BID with "mostly" controlled symptoms. He states that taking Carafate 1 GM BID has helped a lot. Patient denies any associated symptoms of nausea, vomiting, sour eructation, or bloating/gas. Patient admits to some coughing when he experiences shortness of breath.  (Last Visit 06.12.2023) Patient admits long-standing history of GERD. Patient states his last episode of GERD was this morning and occurred last night. He describes his symptoms as a burning sensation. Patient admits continued of Zantac to control  his symptoms.   Denies any recent episodes of  dysphagia, globus, sour eructation, bloating/gas, indigestion, early satiety, changes in appetite, abdominal/epigastric pain, or changes in bowel habits.

## 2024-09-24 NOTE — HPI-ENDOSCOPY (EGD) FOLLOWUP
Patient presents today for follow up to his/her EGD.  Since the procedure patient _______ dysphagia, Globus, changes in appetite, and changes in bowel habits.

## 2024-09-24 NOTE — HPI-BARRETT'S ESOPHAGUS
Patient present today for a follow-up of Valencia's esophagus. Patient advised at last visit (08/06/24) to have an EGD. Patient admits/deniescontinued use of Famotidine 40mg once at bedtime  with control symptoms. He admits/denies any associated symptoms of dyspepsia, dysphagia, excessive belching, globus, sour eructations, bloating/gas, early satiety, changes in appetite, or epigastric pain.   (Last Visit 08/06/24) Patient present today for a follow-up of Valencia's esophagus. Patient advised at last visit (11/07/2023) to have an EGD. He states he has not had any recent flares since his last visit. He states he had an hiatal hernia surgery last year August 8. 2023. Patient admits continued use of Famotidine 40mg once at bedtime  with control symptoms. He denies any associated symptoms of dyspepsia, dysphagia, excessive belching, globus, sour eructations, bloating/gas, early satiety, changes in appetite, or epigastric pain.   Most recent labs completed on 07.30.2024 revealed Glucose: 117H, Sodium: 134L, Calcium: 10.4H, PSA, Total: 8.56H, all other labs were normal.   Labs 01.10.2024 revealed Glucose: 109H, Potassium: 5.5H, Calcium: 108H, Vitamin B12:>2000H, PSA,Total: 9.95H, Vitamin D, 25-OH, Total, IA: 28L, all other labs were normal.   (Last Visit 11.07.2023) Patient present today for a 3 month follow-up of Valencia's esophagus. Patiet states he has not had any recent flares or issues with his Valencia's esophagus. He is not taking Sucralafate. Patient is currently taking Famotidine 40 mg, BID.  Patient denies any associated symptoms of dyspepsia, dysphagia, excessive belching, globus, sour eructations, bloating/gas, early satiety, changes in appetite, or coughing. Of note, patient had the nissen fundoplication on August 8th, 2023 with Dr. Almonte.  (Last Visit 07.03.2023) Patient denies any recent flares with his Valencia's Esophagus. He admits continued use of Sucralfate 1gm BID with better control of symptoms. Patient denies any associated symptoms of dyspepsia, dysphagia, excessive belching, globus, sour eructations, bloating/gas, changes in appetite, abdominal/epigastric pain, or changes in bowel habits. Sometimes he will cough if he experiences shortness of breath. Patient denies any family hx of gastric/esophageal cancer or diseases. Patient will be having the Nissen fundoplication with Dr. Keith Almonte this coming Friday. Of note, sometimes patient experiences early satiety and has been eating smaller meals, which helps. The surgical note was reviewed   (Last Visit 06.12.2023) 78 year old male patient presents today for a longstanding history of Valencia's Esophagus. Patient admits longstanding hx of heartburn and acid reflux. Patient states that his current symptoms are SOB and a non-productive cough associated with eating on May 15. He did drink alcohol on that day which exacerbated.  He denies taking any prescribe or OTC medications without relief of his symptoms.   Patient denies any associated symptoms of dyspepsia, dysphagia, excessive belching, globus, sour eructations, bloating/gas, early satiety, changes in appetite, coughing, abdominal/epigastric pain, or changes in bowel habits. . Patient admits/denies any family hx of gastric/esophageal cancer or diseases.    Patient last EGD was completed on 12.08.2017.